# Patient Record
Sex: FEMALE | Race: WHITE | Employment: UNEMPLOYED | URBAN - METROPOLITAN AREA
[De-identification: names, ages, dates, MRNs, and addresses within clinical notes are randomized per-mention and may not be internally consistent; named-entity substitution may affect disease eponyms.]

---

## 2024-01-31 NOTE — PROGRESS NOTES
Zara Horan is a 58 y.o. female returns for an annual exam     Chief Complaint   Patient presents with    Annual Exam       No LMP recorded. Patient is postmenopausal.  Her periods are absent in flow.  Problems: no problems  Birth Control: post menopausal status.  Last Pap: normal obtained 7/27/2021.  She does not have a history of PARUL 2, 3 or cervical cancer.   Last Mammogram: had a recent mammogram fall 2023 [facility] which was negative for malignancy.     Last Bone Density:  not obtained.  Last colonoscopy: normal obtained 2015.      1. Have you been to the ER, urgent care clinic, or hospitalized since your last visit? No    2. Have you seen or consulted any other health care providers outside of the [facility] since your last visit? No    Examination chaperoned by SHELDON MARTIN CMA.

## 2024-02-01 ENCOUNTER — OFFICE VISIT (OUTPATIENT)
Age: 59
End: 2024-02-01
Payer: OTHER GOVERNMENT

## 2024-02-01 VITALS
DIASTOLIC BLOOD PRESSURE: 74 MMHG | SYSTOLIC BLOOD PRESSURE: 111 MMHG | HEIGHT: 70 IN | WEIGHT: 160 LBS | BODY MASS INDEX: 22.9 KG/M2

## 2024-02-01 DIAGNOSIS — N81.2 INCOMPLETE UTERINE PROLAPSE: ICD-10-CM

## 2024-02-01 DIAGNOSIS — Z01.419 WELL WOMAN EXAM WITH ROUTINE GYNECOLOGICAL EXAM: Primary | ICD-10-CM

## 2024-02-01 DIAGNOSIS — N81.6 RECTOCELE: ICD-10-CM

## 2024-02-01 PROBLEM — F32.A DEPRESSION, UNSPECIFIED: Status: ACTIVE | Noted: 2023-10-05

## 2024-02-01 PROBLEM — E03.9 HYPOTHYROIDISM, UNSPECIFIED: Status: ACTIVE | Noted: 2017-04-25

## 2024-02-01 PROBLEM — F41.9 ANXIETY DISORDER, UNSPECIFIED: Status: ACTIVE | Noted: 2017-08-10

## 2024-02-01 PROCEDURE — 99386 PREV VISIT NEW AGE 40-64: CPT | Performed by: OBSTETRICS & GYNECOLOGY

## 2024-02-01 RX ORDER — LEVOTHYROXINE SODIUM 0.12 MG/1
125 TABLET ORAL DAILY
COMMUNITY
Start: 2023-10-10

## 2024-02-01 RX ORDER — ESCITALOPRAM OXALATE 20 MG/1
20 TABLET ORAL DAILY
COMMUNITY
Start: 2024-01-30

## 2024-02-01 RX ORDER — ALPRAZOLAM 0.5 MG/1
0.5 TABLET ORAL
COMMUNITY
Start: 2024-01-30

## 2024-02-01 NOTE — PROGRESS NOTES
ANNUAL EXAM AGES 40-64      History:  Zara Horan is a 58 y.o. year old  White (non-) female   No LMP recorded. Patient is postmenopausal.    She presents for her annual checkup.     No LMP recorded. Patient is postmenopausal.  No hot flashes or sweats.   Her periods are absent in flow.  No PMB.   Problems: no problems  Birth Control: post menopausal status.  Last Pap: normal obtained 2021.  She does not have a history of PARUL 2, 3 or cervical cancer.   Last Mammogram: had a recent mammogram fall  [facility] which was negative for malignancy.     Last Bone Density:  not obtained.  Last colonoscopy: normal obtained .    Problem List:  Patient Active Problem List    Diagnosis Date Noted    Incomplete uterine prolapse 2024     Overview Note:     Grade 2      Rectocele 2024     Overview Note:     Grade 2      Depression, unspecified 10/05/2023    Anxiety disorder, unspecified 08/10/2017    Hypothyroidism, unspecified 2017    Palpitations 2011     Past Medical History:   Diagnosis Date    Anxiety     Depression     Fibroid, uterine 2008    1.0 cm Intramural.  1.3cm  1.3  1.7 10/15 2.8 cm.  3.2 c m 3/19 3.7, 2.2  3.5 , 1.3  2.8,2.7    Graves disease     PTU    Hematometra 2008    Post partum from Forcep Delivery 10cm. Had cx dilation x2 [facility]PFW    Hypothyroid     Rectocele     Grade 2    Third degree obstetrical tear 2008    Urge incontinence     Uterine prolapse 2010     Past Surgical History:   Procedure Laterality Date    COLONOSCOPY  2015    HYSTEROSCOPY  10/14/2008       OB History    Para Term  AB Living   1 1 1 0 0 1   SAB IAB Ectopic Molar Multiple Live Births   0 0 0 0 0 1      # Outcome Date GA Lbr Haider/2nd Weight Sex Delivery Anes PTL Lv   1 Term 08 39w0d  3.43 kg (7 lb 9 oz) F Vag-Forceps EPI  SANDRA      Complications: Third degree obstetrical tear     Social History

## 2025-08-05 ENCOUNTER — OFFICE VISIT (OUTPATIENT)
Age: 60
End: 2025-08-05
Payer: OTHER GOVERNMENT

## 2025-08-05 VITALS
WEIGHT: 161.2 LBS | SYSTOLIC BLOOD PRESSURE: 119 MMHG | BODY MASS INDEX: 23.08 KG/M2 | DIASTOLIC BLOOD PRESSURE: 78 MMHG | HEART RATE: 64 BPM | RESPIRATION RATE: 18 BRPM | HEIGHT: 70 IN

## 2025-08-05 DIAGNOSIS — Z01.419 WELL WOMAN EXAM WITH ROUTINE GYNECOLOGICAL EXAM: Primary | ICD-10-CM

## 2025-08-05 DIAGNOSIS — Z12.31 BREAST CANCER SCREENING BY MAMMOGRAM: ICD-10-CM

## 2025-08-05 PROCEDURE — 99396 PREV VISIT EST AGE 40-64: CPT | Performed by: OBSTETRICS & GYNECOLOGY

## 2025-08-05 SDOH — ECONOMIC STABILITY: FOOD INSECURITY: WITHIN THE PAST 12 MONTHS, YOU WORRIED THAT YOUR FOOD WOULD RUN OUT BEFORE YOU GOT MONEY TO BUY MORE.: NEVER TRUE

## 2025-08-05 SDOH — ECONOMIC STABILITY: FOOD INSECURITY: WITHIN THE PAST 12 MONTHS, THE FOOD YOU BOUGHT JUST DIDN'T LAST AND YOU DIDN'T HAVE MONEY TO GET MORE.: NEVER TRUE

## 2025-08-08 LAB
CYTOLOGIST CVX/VAG CYTO: NORMAL
CYTOLOGY CVX/VAG DOC CYTO: NORMAL
CYTOLOGY CVX/VAG DOC THIN PREP: NORMAL
DX ICD CODE: NORMAL
HPV GENOTYPE REFLEX: NORMAL
HPV I/H RISK 4 DNA CVX QL PROBE+SIG AMP: NEGATIVE
OTHER STN SPEC: NORMAL
SERVICE CMNT-IMP: NORMAL
STAT OF ADQ CVX/VAG CYTO-IMP: NORMAL